# Patient Record
Sex: MALE | Race: WHITE | Employment: UNEMPLOYED | URBAN - METROPOLITAN AREA
[De-identification: names, ages, dates, MRNs, and addresses within clinical notes are randomized per-mention and may not be internally consistent; named-entity substitution may affect disease eponyms.]

---

## 2019-07-06 ENCOUNTER — HOSPITAL ENCOUNTER (EMERGENCY)
Facility: CLINIC | Age: 59
Discharge: HOME | End: 2019-07-06
Attending: EMERGENCY MEDICINE | Admitting: EMERGENCY MEDICINE
Payer: COMMERCIAL

## 2019-07-06 VITALS
WEIGHT: 209 LBS | TEMPERATURE: 98.5 F | SYSTOLIC BLOOD PRESSURE: 158 MMHG | OXYGEN SATURATION: 99 % | RESPIRATION RATE: 16 BRPM | DIASTOLIC BLOOD PRESSURE: 88 MMHG | HEART RATE: 108 BPM

## 2019-07-06 DIAGNOSIS — K08.89 PAIN, DENTAL: ICD-10-CM

## 2019-07-06 PROCEDURE — 99283 EMERGENCY DEPT VISIT LOW MDM: CPT

## 2019-07-06 PROCEDURE — 99283 EMERGENCY DEPT VISIT LOW MDM: CPT | Mod: Z6 | Performed by: EMERGENCY MEDICINE

## 2019-07-06 RX ORDER — MORPHINE SULFATE 15 MG/1
15-30 TABLET ORAL EVERY 4 HOURS PRN
Qty: 6 TABLET | Refills: 0 | Status: SHIPPED | OUTPATIENT
Start: 2019-07-06

## 2019-07-06 RX ORDER — BUPIVACAINE HYDROCHLORIDE AND EPINEPHRINE 5; 5 MG/ML; UG/ML
INJECTION, SOLUTION PERINEURAL
Status: DISCONTINUED
Start: 2019-07-06 | End: 2019-07-07 | Stop reason: HOSPADM

## 2019-07-06 RX ORDER — PENICILLIN V POTASSIUM 500 MG/1
500 TABLET, FILM COATED ORAL 4 TIMES DAILY
Qty: 28 TABLET | Refills: 0 | Status: SHIPPED | OUTPATIENT
Start: 2019-07-06 | End: 2019-07-13

## 2019-07-06 RX ORDER — IBUPROFEN 200 MG
800 TABLET ORAL EVERY 8 HOURS PRN
Qty: 30 TABLET | Refills: 0 | COMMUNITY
Start: 2019-07-06 | End: 2019-07-11

## 2019-07-06 RX ORDER — ACETAMINOPHEN 500 MG
1000 TABLET ORAL EVERY 8 HOURS PRN
Qty: 100 TABLET | Refills: 0 | COMMUNITY
Start: 2019-07-06 | End: 2019-07-11

## 2019-07-06 ASSESSMENT — ENCOUNTER SYMPTOMS
NECK PAIN: 0
SHORTNESS OF BREATH: 0
TROUBLE SWALLOWING: 0
ABDOMINAL PAIN: 0
COUGH: 0
FEVER: 0
CHEST TIGHTNESS: 0
HEADACHES: 0
APPETITE CHANGE: 0
NECK STIFFNESS: 0
CHILLS: 0

## 2019-07-06 NOTE — ED AVS SNAPSHOT
[facility] Emergency Department  [address] [address]  Phone:  [phone]  Fax:  [phone]                                    Jan Poe   MRN: [id number]    Department:  [facility] Emergency Department   Date of Visit:  7/6/2019           After Visit Summary Signature Page    I have received my discharge instructions, and my questions have been answered. I have discussed any challenges I see with this plan with the nurse or doctor.    ..........................................................................................................................................  Patient/Patient Representative Signature      ..........................................................................................................................................  Patient Representative Print Name and Relationship to Patient    ..................................................               ................................................  Date                                   Time    ..........................................................................................................................................  Reviewed by Signature/Title    ...................................................              ..............................................  Date                                               Time          22EPIC Rev 08/18

## 2019-07-07 NOTE — DISCHARGE INSTRUCTIONS
Alternate acetaminophen with ibuprofen every 4 hours as needed for pain or fever (example: acetaminophen at 8am, ibuprofen at 12pm, acetaminophen at 4pm, ibuprofen at 8pm, etc).  I recommended keeping a note documenting which medication you gave and the time it was given. This will help you keep track of what medication to give next.  See discharge papers or medication label for dose.    -------------------------------------------------      Many of these clinics offer a sliding fee option for patients that qualify, and see patients on a walk-in or same day basis. Please call each clinic directly. As services, hours, fees and policies vary greatly.          [facility], [facility]  [phone]  Sees no insurance  [facility]  [phone]  Preventive services only  [facility] (mult loc) [phone]  [facility]    ([facility]), [facility]  [phone]  [facility]       [phone]  Preventive services only  [facility]  [phone]  Accepts MA & sees no ins  Critical access hospital [facility],      Accepts MA & sees no ins   [place]   [phone]; [phone]  Critical access hospital [facility]   Accepts MA & sees no ins       [phone]  [facility]  [phone]   Accepts MA emergencies  [facility] [phone]  [facility],     Accepts MA   [place]   [phone]    [facility] [phone]  Accepts MA & sees no ins   [facility]   Dental Clinic    [phone]  [facility], [facility]  [phone]   [facility] [phone]  [facility]  Preventive services only   [place]   [phone]  [facility] [phone]  [facility]  [phone]  Same day [facility] [phone]  Same day  [facility],      Same day [facility]   [phone]    [facility], [facility] [phone]  Free clinic, walk-in only  [facility] (multiple locations) [phone]      [facility] , [facility] [phone]    [facility] [phone]  Free clinic, walk-in only  [facility]  [phone]  [facility] [phone] (adults)       [phone] (children)  [facility] [phone]    Also, referral service for low cost dental and healthcare: [phone]  [phone]

## 2019-07-07 NOTE — ED PROVIDER NOTES
History     Chief Complaint   Patient presents with     Dental Pain     left back 2 teeth     HPI  Jan Poe is a 59 year old male with history of poor dentition presents for evaluation of severe left upper dental pain.  Patient reports several days of pain which is been progressively worsening.  He reports now pain is so severe he is unable to eat due to the pain.  He also reports subjective swelling around the face and head area.  Denies fever or chills.  Reports that he previously had a fracture of the tooth but has been having intermittent pain for months.  Has not seen a dentist recently.  No other specific complaints currently.      Allergies:  No Known Allergies    Problem List:    There are no active problems to display for this patient.       Past Medical History:    No past medical history on file.    Past Surgical History:    No past surgical history on file.    Family History:    No family history on file.    Social History:  Marital Status:    Social History     Tobacco Use     Smoking status: Not on file   Substance Use Topics     Alcohol use: Not on file     Drug use: Not on file        Medications:      acetaminophen (TYLENOL) 500 MG tablet   benzocaine (ORAJEL/ANBESOL) 10 % gel   ibuprofen (ADVIL/MOTRIN) 200 MG tablet   morphine (MSIR) 15 MG IR tablet   penicillin V (VEETID) 500 MG tablet         Review of Systems   Constitutional: Negative for appetite change, chills and fever.   HENT: Positive for dental problem. Negative for congestion, drooling and trouble swallowing.    Respiratory: Negative for cough, chest tightness and shortness of breath.    Cardiovascular: Negative for chest pain.   Gastrointestinal: Negative for abdominal pain.   Musculoskeletal: Negative for neck pain and neck stiffness.   Neurological: Negative for headaches.   All other systems reviewed and are negative.      Physical Exam   BP: 158/88  Pulse: 108  Temp: 98.5  F (36.9  C)  Resp: 16  Weight: 94.8 kg (209  lb)  SpO2: 99 %      Physical Exam   Constitutional: He is oriented to person, place, and time. He appears well-developed and well-nourished.   Appears uncomfortable   HENT:   Head: Normocephalic and atraumatic.   Mouth/Throat: Uvula is midline and oropharynx is clear and moist. No trismus in the jaw. Abnormal dentition. Dental caries present. No uvula swelling.       Eyes: Conjunctivae are normal.   Neck: Normal range of motion. Neck supple.   Cardiovascular: Normal rate.   Neurological: He is alert and oriented to person, place, and time.   Skin: Skin is warm and dry. Capillary refill takes less than 2 seconds.   Psychiatric: He has a normal mood and affect.   Nursing note and vitals reviewed.      ED Course        Procedures                   No results found for this or any previous visit (from the past 24 hour(s)).    Medications   bupivacaine 0.5 % EPINEPHrine 1:200,000 0.5% -[id number] dental injection SOLN (has no administration in time range)       11:00 PM: Performed dental block using standard technique to the first, second, third molars via a single injection infusing 1.8 mL's of bupivacaine with epinephrine with substantial improvement in his dental pain.      Assessments & Plan (with Medical Decision Making)  59-year-old male clinic for evaluation of severe left upper dental pain.  He has history of poor dentition.  He is followed with a dentist.  Symptoms waxing waning for years but worse tonight.  Has subjective swelling in the face as well.  Pain controlled with local dental block.  Started on a course of penicillin due to concern for possible small apical abscess in addition to local pain.  Follow-up with dentist per     I have reviewed the nursing notes.    I have reviewed the findings, diagnosis, plan and need for follow up with the patient.          Medication List      Started    acetaminophen 500 MG tablet  Commonly known as:  TYLENOL  1,000 mg, Oral, EVERY 8 HOURS PRN     benzocaine 10 %  gel  Commonly known as:  ORAJEL/ANBESOL  Mouth/Throat, 4 TIMES DAILY PRN     ibuprofen 200 MG tablet  Commonly known as:  ADVIL/MOTRIN  800 mg, Oral, EVERY 8 HOURS PRN     morphine 15 MG IR tablet  Commonly known as:  MSIR  15-30 mg, Oral, EVERY 4 HOURS PRN     penicillin V 500 MG tablet  Commonly known as:  VEETID  500 mg, Oral, 4 TIMES DAILY            Final diagnoses:   Pain, dental - Left upper       7/6/2019   [facility] EMERGENCY DEPARTMENT     Olivera, Bandar Trimble MD  07/06/19 2074